# Patient Record
Sex: FEMALE | Race: WHITE | NOT HISPANIC OR LATINO | URBAN - METROPOLITAN AREA
[De-identification: names, ages, dates, MRNs, and addresses within clinical notes are randomized per-mention and may not be internally consistent; named-entity substitution may affect disease eponyms.]

---

## 2018-01-02 ENCOUNTER — EMERGENCY (EMERGENCY)
Facility: HOSPITAL | Age: 53
LOS: 1 days | End: 2018-01-02
Payer: COMMERCIAL

## 2018-01-02 PROCEDURE — 99285 EMERGENCY DEPT VISIT HI MDM: CPT

## 2018-01-02 PROCEDURE — 74177 CT ABD & PELVIS W/CONTRAST: CPT | Mod: 26

## 2018-12-05 ENCOUNTER — TRANSCRIPTION ENCOUNTER (OUTPATIENT)
Age: 53
End: 2018-12-05

## 2019-09-13 ENCOUNTER — OUTPATIENT (OUTPATIENT)
Dept: OUTPATIENT SERVICES | Facility: HOSPITAL | Age: 54
LOS: 1 days | End: 2019-09-13

## 2019-09-13 ENCOUNTER — EMERGENCY (EMERGENCY)
Facility: HOSPITAL | Age: 54
LOS: 1 days | End: 2019-09-13
Admitting: INTERNAL MEDICINE
Payer: COMMERCIAL

## 2019-09-13 PROCEDURE — 93010 ELECTROCARDIOGRAM REPORT: CPT

## 2019-09-13 PROCEDURE — 99285 EMERGENCY DEPT VISIT HI MDM: CPT

## 2019-09-13 PROCEDURE — 71045 X-RAY EXAM CHEST 1 VIEW: CPT | Mod: 26

## 2019-09-13 PROCEDURE — 99254 IP/OBS CNSLTJ NEW/EST MOD 60: CPT

## 2019-09-14 ENCOUNTER — OUTPATIENT (OUTPATIENT)
Dept: OUTPATIENT SERVICES | Facility: HOSPITAL | Age: 54
LOS: 1 days | End: 2019-09-14

## 2019-09-14 PROCEDURE — 99232 SBSQ HOSP IP/OBS MODERATE 35: CPT

## 2019-09-14 PROCEDURE — 93010 ELECTROCARDIOGRAM REPORT: CPT

## 2019-09-16 PROBLEM — Z00.00 ENCOUNTER FOR PREVENTIVE HEALTH EXAMINATION: Status: ACTIVE | Noted: 2019-09-16

## 2019-09-24 ENCOUNTER — APPOINTMENT (OUTPATIENT)
Dept: CARDIOLOGY | Facility: CLINIC | Age: 54
End: 2019-09-24
Payer: COMMERCIAL

## 2019-09-24 ENCOUNTER — NON-APPOINTMENT (OUTPATIENT)
Age: 54
End: 2019-09-24

## 2019-09-24 VITALS
HEIGHT: 67 IN | DIASTOLIC BLOOD PRESSURE: 70 MMHG | SYSTOLIC BLOOD PRESSURE: 104 MMHG | WEIGHT: 224 LBS | BODY MASS INDEX: 35.16 KG/M2 | OXYGEN SATURATION: 96 % | HEART RATE: 84 BPM | RESPIRATION RATE: 18 BRPM

## 2019-09-24 DIAGNOSIS — Z78.9 OTHER SPECIFIED HEALTH STATUS: ICD-10-CM

## 2019-09-24 DIAGNOSIS — Z72.3 LACK OF PHYSICAL EXERCISE: ICD-10-CM

## 2019-09-24 DIAGNOSIS — Z80.3 FAMILY HISTORY OF MALIGNANT NEOPLASM OF BREAST: ICD-10-CM

## 2019-09-24 DIAGNOSIS — R06.02 SHORTNESS OF BREATH: ICD-10-CM

## 2019-09-24 DIAGNOSIS — Z83.49 FAMILY HISTORY OF OTHER ENDOCRINE, NUTRITIONAL AND METABOLIC DISEASES: ICD-10-CM
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
## 2019-09-24 DIAGNOSIS — R42 DIZZINESS AND GIDDINESS: ICD-10-CM

## 2019-09-24 PROCEDURE — 99214 OFFICE O/P EST MOD 30 MIN: CPT

## 2019-09-24 NOTE — PHYSICAL EXAM
[General Appearance - Well Developed] : well developed [Normal Appearance] : normal appearance [Well Groomed] : well groomed [General Appearance - Well Nourished] : well nourished [No Deformities] : no deformities [General Appearance - In No Acute Distress] : no acute distress [Normal Conjunctiva] : the conjunctiva exhibited no abnormalities [Eyelids - No Xanthelasma] : the eyelids demonstrated no xanthelasmas [No Oral Pallor] : no oral pallor [No Oral Cyanosis] : no oral cyanosis [Normal Jugular Venous A Waves Present] : normal jugular venous A waves present [No Jugular Venous Husain A Waves] : no jugular venous husain A waves [Normal Jugular Venous V Waves Present] : normal jugular venous V waves present [Heart Rate And Rhythm] : heart rate and rhythm were normal [Heart Sounds] : normal S1 and S2 [Exaggerated Use Of Accessory Muscles For Inspiration] : no accessory muscle use [Respiration, Rhythm And Depth] : normal respiratory rhythm and effort [Bowel Sounds] : normal bowel sounds [Auscultation Breath Sounds / Voice Sounds] : lungs were clear to auscultation bilaterally [Abdomen Tenderness] : non-tender [Abdomen Soft] : soft [Abdomen Mass (___ Cm)] : no abdominal mass palpated [Gait - Sufficient For Exercise Testing] : the gait was sufficient for exercise testing [Abnormal Walk] : normal gait [Nail Clubbing] : no clubbing of the fingernails [Cyanosis, Localized] : no localized cyanosis [Petechial Hemorrhages (___cm)] : no petechial hemorrhages [] : no rash [Skin Color & Pigmentation] : normal skin color and pigmentation [No Venous Stasis] : no venous stasis [Skin Lesions] : no skin lesions [No Xanthoma] : no  xanthoma was observed [No Skin Ulcers] : no skin ulcer [Affect] : the affect was normal [Oriented To Time, Place, And Person] : oriented to person, place, and time [Mood] : the mood was normal [No Anxiety] : not feeling anxious [FreeTextEntry1] : I-II/VI JONI RSB.  +1 edema b/l.

## 2019-09-24 NOTE — ASSESSMENT
[FreeTextEntry1] : To review, Emily is a very pleasant 54-year-old female with the following active issues.\par \par Chest pain: Recent admission into the hospital. Records have been requested. With significant family history, slightly abnormal EKG, and both typical and atypical symptoms, would recommend stress echocardiogram for further evaluation. Lab work will be ordered her for further risk stratification. Red flag symptoms discussed.\par \par Shortness of breath: History of mitral valve prolapse.  Evaluation as above. She will get me copies of her nose recent echocardiogram.  Consider repeat depending on quality of study as this was supposedly done by mobile imaging.\par \par Dizziness: Carotid ultrasound was also supposedly performed by mobile imaging. Will review results as soon as they're available. Recommend possible followup with ENT to evaluate vertiginous symptoms.  Advise patient to increase fluid intake as she works outside a lot and has had documented episodes of hypotension at [employer] office. She admits to not drinking very much.\par \par Hypothyroidism: Follows with Dr. Herr.  Reports last TSH was normal. Continue followup.
Initiate Treatment: Spironolactone 50mg bid
Otc Regimen: Continue Minoxidil as directed \\nViviscal
Detail Level: Zone

## 2019-09-24 NOTE — REVIEW OF SYSTEMS
[Shortness Of Breath] : shortness of breath [Chest Pain] : chest pain [Lower Ext Edema] : lower extremity edema [Dizziness] : dizziness [Negative] : Heme/Lymph [Dyspnea on exertion] : not dyspnea during exertion [Chest  Pressure] : no chest pressure [Leg Claudication] : no intermittent leg claudication [Palpitations] : no palpitations

## 2019-09-24 NOTE — REASON FOR VISIT
[Consultation] : a consultation regarding [FreeTextEntry2] : Chest pain and shortness of breath [FreeTextEntry1] : Tiffanie is a very pleasant 54-year-old female that presents today for cardiac consultation. She has a past medical history as noted below.  Recently admitted to [facility] for chest pain and shortness of breath. I unfortunately do not have those records to review. Patient states that she was kept overnight and had serial blood draws. States that she was discharged with instructions to followup as an outpatient. Records will be requested.\par \par Chest pain: Started on Thursday evening and continued into Friday morning. Left side of the chest radiating down left arm. No aggravating or alleviating symptoms. There was associated shortness of breath. Denied nausea or diaphoresis.  Presented to ED for further evaluation and was subsequently admitted.  \par \par Admits to occasional edema.  Denies PND or orthopnea.\par \par Also admits to episodes of dizziness. Occurs with head position or standing up quickly. Patient states that she recently had echocardiogram and carotid ultrasound done for her job. She will bring us copies. Denies any syncope or near syncope.  Carotid ultrasound performed for screening purposes through her job. She will bring in copies of those reports.  \par \par She does admit to tick exposures on her job. She has not been evaluated for tick born disease. \par \par She works for the [employer] working outside on a regular basis but does not routinely exercise.\par \par EKG done in the office today for evaluation of chest pain shortness of breath was ordered and reviewed by me. Normal sinus rhythm with incomplete right bundle branch block.\par \par Blood pressure on my evaluation. Left arm 112/68 mmHg. Right arm 112/62 mmHg.

## 2019-10-10 ENCOUNTER — APPOINTMENT (OUTPATIENT)
Dept: CARDIOLOGY | Facility: CLINIC | Age: 54
End: 2019-10-10
Payer: COMMERCIAL

## 2019-10-10 PROCEDURE — 93351 STRESS TTE COMPLETE: CPT

## 2019-10-15 ENCOUNTER — APPOINTMENT (OUTPATIENT)
Dept: CARDIOLOGY | Facility: CLINIC | Age: 54
End: 2019-10-15
Payer: COMMERCIAL

## 2019-10-15 VITALS
SYSTOLIC BLOOD PRESSURE: 120 MMHG | DIASTOLIC BLOOD PRESSURE: 68 MMHG | BODY MASS INDEX: 35.31 KG/M2 | HEIGHT: 67 IN | OXYGEN SATURATION: 96 % | WEIGHT: 225 LBS | HEART RATE: 72 BPM

## 2019-10-15 DIAGNOSIS — R07.9 CHEST PAIN, UNSPECIFIED: ICD-10-CM

## 2019-10-15 DIAGNOSIS — I34.1 NONRHEUMATIC MITRAL (VALVE) PROLAPSE: ICD-10-CM

## 2019-10-15 PROCEDURE — 99214 OFFICE O/P EST MOD 30 MIN: CPT

## 2019-10-30 NOTE — REVIEW OF SYSTEMS
[Negative] : Heme/Lymph [Shortness Of Breath] : no shortness of breath [Dyspnea on exertion] : not dyspnea during exertion [Chest  Pressure] : no chest pressure [Chest Pain] : no chest pain [Lower Ext Edema] : no extremity edema [Leg Claudication] : no intermittent leg claudication [Palpitations] : no palpitations [Dizziness] : no dizziness

## 2019-10-30 NOTE — ASSESSMENT
[FreeTextEntry1] : To review, Emily is a very pleasant 54-year-old female with the following active issues.\par \par History of chest pain which has not recurred. Stress echo without evidence of ischemia. Limitations of non-invasive testing reviewed. \par \par History of mitral valve prolapse. Patient will obtain recent echocardiogram.  Consider repeat depending on quality of study as this was supposedly done by mobile imaging.\par \par Carotid ultrasound was also supposedly performed by mobile imaging. Will review results as soon as they are available.\par \par Hypothyroidism: Follows with Dr. Herr.  Reports last TSH was normal. Continue followup.\par \par Patient is cleared from a cardiovascular standpoint to return to her physical duties at the [employer]. \par \par Red flag symptoms which would warrant sooner emergent evaluation reviewed with the patient. \par Questions and concerns were addressed and answered. \par \par Sincerely,\par \par Candy Nava PA-C\par Patients history, testing and plan reviewed with supervising MD: Dr. Staci Stone

## 2019-10-30 NOTE — PHYSICAL EXAM
[General Appearance - Well Developed] : well developed [Normal Appearance] : normal appearance [Well Groomed] : well groomed [No Deformities] : no deformities [General Appearance - Well Nourished] : well nourished [General Appearance - In No Acute Distress] : no acute distress [Normal Conjunctiva] : the conjunctiva exhibited no abnormalities [Eyelids - No Xanthelasma] : the eyelids demonstrated no xanthelasmas [No Oral Pallor] : no oral pallor [No Oral Cyanosis] : no oral cyanosis [Normal Jugular Venous A Waves Present] : normal jugular venous A waves present [No Jugular Venous Husain A Waves] : no jugular venous husain A waves [Normal Jugular Venous V Waves Present] : normal jugular venous V waves present [Respiration, Rhythm And Depth] : normal respiratory rhythm and effort [Exaggerated Use Of Accessory Muscles For Inspiration] : no accessory muscle use [Auscultation Breath Sounds / Voice Sounds] : lungs were clear to auscultation bilaterally [Heart Rate And Rhythm] : heart rate and rhythm were normal [Heart Sounds] : normal S1 and S2 [Bowel Sounds] : normal bowel sounds [Abdomen Soft] : soft [Abdomen Tenderness] : non-tender [Abdomen Mass (___ Cm)] : no abdominal mass palpated [Abnormal Walk] : normal gait [Gait - Sufficient For Exercise Testing] : the gait was sufficient for exercise testing [Cyanosis, Localized] : no localized cyanosis [Nail Clubbing] : no clubbing of the fingernails [Petechial Hemorrhages (___cm)] : no petechial hemorrhages [Skin Color & Pigmentation] : normal skin color and pigmentation [] : no rash [No Venous Stasis] : no venous stasis [Skin Lesions] : no skin lesions [No Skin Ulcers] : no skin ulcer [No Xanthoma] : no  xanthoma was observed [Oriented To Time, Place, And Person] : oriented to person, place, and time [Affect] : the affect was normal [Mood] : the mood was normal [No Anxiety] : not feeling anxious [FreeTextEntry1] : I-II/VI JONI RSB.  +1 edema b/l.

## 2019-10-30 NOTE — REASON FOR VISIT
[Consultation] : a consultation regarding [FreeTextEntry2] : Chest pain and shortness of breath [FreeTextEntry1] : Tiffanie is a very pleasant 54-year-old female that presents today in clinical follow-up to review stress echo. She has a past medical history as noted below.  Recently admitted to [facility] for chest pain and shortness of breath. She feels that chest pain was musculoskeletal and has not recurred. Physically active with her job with no new exertional complaints.\par \par Chest pain was described as left sided and increased with movement and pressure to area.There was associated shortness of breath. Denied nausea or diaphoresis.  \par \par She works for [employer] working outside on a regular basis but does not routinely exercise.\par \par EKG Normal sinus rhythm with incomplete right bundle branch block.\par \par Today she denies chest pain, pressure, unusual shortness of breath, lightheadedness, dizziness, near syncope or syncope. \par \par There is no prior history of myocardial infarction, coronary revascularization, history of ischemic heart disease, or symptomatic congestive heart failure. There is no history of symptomatic arrhythmias including atrial fibrillation. \par \par Stress echo 10/10/19 exercised for 9min 25sec of Christian protocol with no evidence of ischemia

## 2020-05-06 ENCOUNTER — TRANSCRIPTION ENCOUNTER (OUTPATIENT)
Age: 55
End: 2020-05-06

## 2020-10-13 ENCOUNTER — APPOINTMENT (OUTPATIENT)
Dept: CARDIOLOGY | Facility: CLINIC | Age: 55
End: 2020-10-13

## 2022-10-17 ENCOUNTER — NON-APPOINTMENT (OUTPATIENT)
Age: 57
End: 2022-10-17

## 2022-10-17 DIAGNOSIS — Z82.49 FAMILY HISTORY OF ISCHEMIC HEART DISEASE AND OTHER DISEASES OF THE CIRCULATORY SYSTEM: ICD-10-CM

## 2022-10-17 DIAGNOSIS — Z81.8 FAMILY HISTORY OF OTHER MENTAL AND BEHAVIORAL DISORDERS: ICD-10-CM

## 2022-10-17 DIAGNOSIS — Z78.9 OTHER SPECIFIED HEALTH STATUS: ICD-10-CM

## 2022-10-17 DIAGNOSIS — Z78.0 ASYMPTOMATIC MENOPAUSAL STATE: ICD-10-CM

## 2022-10-17 DIAGNOSIS — R23.2 FLUSHING: ICD-10-CM

## 2022-12-05 ENCOUNTER — APPOINTMENT (OUTPATIENT)
Dept: ENDOCRINOLOGY | Facility: CLINIC | Age: 57
End: 2022-12-05

## 2022-12-05 VITALS
HEIGHT: 67 IN | TEMPERATURE: 97.7 F | HEART RATE: 83 BPM | SYSTOLIC BLOOD PRESSURE: 138 MMHG | OXYGEN SATURATION: 99 % | DIASTOLIC BLOOD PRESSURE: 88 MMHG | BODY MASS INDEX: 36.44 KG/M2 | WEIGHT: 232.19 LBS

## 2022-12-05 DIAGNOSIS — R23.8 OTHER SKIN CHANGES: ICD-10-CM

## 2022-12-05 PROCEDURE — 99213 OFFICE O/P EST LOW 20 MIN: CPT

## 2022-12-05 NOTE — ASSESSMENT
[FreeTextEntry1] : Iron white female with a past medical history of a primary hypothyroidism presents with a main complaint of or dryness of the hair flaky changes loss of eyebrows blood test done in October which showed that the TSH was 3.14 total T4 was 7.3 and  the thyroid was  antibodies were positive.  Patient clinically is if euthyroid suggest slightly elevated and the T4 is  also on the borderline.  Recommendation\par 1.  I would increase the patient levothyroxine 200 mcg tablets every day.  The goal of therapy is to maintain TSH between 1 and 2\par 2.  Patient may require some further work-up of her of her dry skin such as an autoimmune disease which can cause similar changes\par 3.  Patient will need a repeat set of labs to check her TSH and a free T4 in 6 weeks time the plan was discussed in detail with the patient.  Thank you

## 2022-12-05 NOTE — HISTORY OF PRESENT ILLNESS
[FreeTextEntry1] : This is a 57-year-old white female with a past medical history of primary hypothyroidism, multinodular goiter and Hashimoto's disease who is currently maintained on levothyroxine 88 mcg tablets every day.  Patient was last seen in the office approximately 10 months ago.  Patient claimed that she is compliant with her medications and takes it on a daily basis.  However that that the patient is complaining of dry and scaly hair with some hair loss on the scalp area for the past few weeks.  She denies any history of recent infections.  There is no history of any palpitations chest pain shortness of breath.  She did notice that she has been losing her eyebrows also.

## 2022-12-05 NOTE — PHYSICAL EXAM
[Well Nourished] : well nourished [Healthy Appearance] : healthy appearance [No Acute Distress] : no acute distress [Normal Sclera/Conjunctiva] : normal sclera/conjunctiva [PERRL] : pupils equal, round and reactive to light [Normal Outer Ear/Nose] : the ears and nose were normal in appearance [Normal Hearing] : hearing was normal [No Neck Mass] : no neck mass was observed [Thyroid Not Enlarged] : the thyroid was not enlarged [Clear to Auscultation] : lungs were clear to auscultation bilaterally [Normal S1, S2] : normal S1 and S2 [Normal Rate] : heart rate was normal [Carotids Normal] : carotid pulses were normal with no bruits [No Edema] : no peripheral edema [Pedal Pulses Normal] : the pedal pulses are present [Soft] : abdomen soft [No HSM] : no hepato-splenomegaly [Normal Supraclavicular Nodes] : no supraclavicular lymphadenopathy [Normal Anterior Cervical Nodes] : no anterior cervical lymphadenopathy [No Stigmata of Cushings Syndrome] : no stigmata of Cushings Syndrome [No Joint Swelling] : no joint swelling seen [No Rash] : no rash [No Motor Deficits] : the motor exam was normal [Normal Reflexes] : deep tendon reflexes were 2+ and symmetric [No Tremors] : no tremors [de-identified] : Deferred [FreeTextEntry1] : Deferred [de-identified] : Deferred [de-identified] : Dry skin of the scalp

## 2023-03-16 ENCOUNTER — APPOINTMENT (OUTPATIENT)
Dept: ENDOCRINOLOGY | Facility: CLINIC | Age: 58
End: 2023-03-16
Payer: COMMERCIAL

## 2023-03-16 VITALS
TEMPERATURE: 98.3 F | DIASTOLIC BLOOD PRESSURE: 70 MMHG | HEART RATE: 82 BPM | SYSTOLIC BLOOD PRESSURE: 118 MMHG | BODY MASS INDEX: 38.34 KG/M2 | WEIGHT: 244.25 LBS | OXYGEN SATURATION: 99 % | HEIGHT: 67 IN

## 2023-03-16 DIAGNOSIS — E04.2 NONTOXIC MULTINODULAR GOITER: ICD-10-CM

## 2023-03-16 PROCEDURE — 99213 OFFICE O/P EST LOW 20 MIN: CPT

## 2023-03-16 RX ORDER — LEVOTHYROXINE SODIUM 88 UG/1
88 TABLET ORAL DAILY
Qty: 60 | Refills: 0 | Status: DISCONTINUED | COMMUNITY
Start: 2019-09-24 | End: 2023-03-16

## 2023-03-16 NOTE — ASSESSMENT
[FreeTextEntry1] : 57-year-old white female with a past medical history of a multinodular goiter and primary hypothyroidism.  She recently underwent a sonogram of the thyroid on March 14 which did not show any significant changes in the left lower pole thyroid nodule which is solid and isoechoic.  It measures 1.2 x 0.7 x 1.0 cm and it has not changed significantly from the previous study in 2022.  The right lobe of the thyroid does not show any discrete dominant thyroid nodule.  Patient also had a blood test performed on 3/10/2023 which shows a normal TSH of 1.13 and a free T4 of 1.21.  Patient exam is essentially stable without any changes in in her examination.  Clinically she is euthyroid.  Recommendation\par 1.  I did advise the patient to continue her current dose of levothyroxine 100 mcg tablets daily\par 2.  The importance of strict diet and exercise and to maintain a normal weight was discussed with the patient.\par 3.  If the patient condition remained stable then she will follow-up in approximately 1 years time.  Thank you

## 2023-03-16 NOTE — HISTORY OF PRESENT ILLNESS
[FreeTextEntry1] : .-year-old white female with a past medical history of primary hypothyroidism multinodular goiter and Hashimoto's disease and who is currently maintained on levothyroxine 88 mcg tablets every day.  Patient now presents for routine follow-up and evaluation.  Patient claimed that she is compliant with her medications and she takes it daily in the morning on empty stomach.  Does report a slight weight gain but she has not been working secondary to injury to her left index finger.  She denies any symptoms of palpitations chest pain hair loss or heat intolerance.

## 2023-04-25 RX ORDER — LEVOTHYROXINE SODIUM 0.1 MG/1
100 TABLET ORAL DAILY
Qty: 90 | Refills: 2 | Status: ACTIVE | COMMUNITY
Start: 2023-03-16 | End: 1900-01-01

## 2023-04-25 RX ORDER — LEVOTHYROXINE SODIUM 0.1 MG/1
100 TABLET ORAL
Qty: 90 | Refills: 2 | Status: DISCONTINUED | COMMUNITY
Start: 2022-12-05 | End: 2023-04-25

## 2023-12-09 ENCOUNTER — NON-APPOINTMENT (OUTPATIENT)
Age: 58
End: 2023-12-09

## 2024-03-18 ENCOUNTER — APPOINTMENT (OUTPATIENT)
Dept: ENDOCRINOLOGY | Facility: CLINIC | Age: 59
End: 2024-03-18
Payer: COMMERCIAL

## 2024-03-18 VITALS
HEIGHT: 67 IN | BODY MASS INDEX: 34.53 KG/M2 | RESPIRATION RATE: 16 BRPM | HEART RATE: 82 BPM | DIASTOLIC BLOOD PRESSURE: 68 MMHG | TEMPERATURE: 98.1 F | SYSTOLIC BLOOD PRESSURE: 122 MMHG | WEIGHT: 220 LBS | OXYGEN SATURATION: 99 %

## 2024-03-18 DIAGNOSIS — E03.9 HYPOTHYROIDISM, UNSPECIFIED: ICD-10-CM

## 2024-03-18 PROCEDURE — 99213 OFFICE O/P EST LOW 20 MIN: CPT

## 2024-03-18 PROCEDURE — G2211 COMPLEX E/M VISIT ADD ON: CPT

## 2024-03-18 NOTE — ASSESSMENT
[FreeTextEntry1] : Middle-age white female with a past medical history of hypothyroidism secondary to Hashimoto's disease currently on levothyroxine 88 mcg tablet for the past 2 weeks.  Patient had a blood drawn on 4 March of this year and at that time her complete metabolic panel was normal, TSH was 1.1 and the free T4 was 0.9.  Patient at that time was still taking the levothyroxine 100 mcg tablets daily.  Patient clinically stable and euthyroid.  She also has underlying obesity.  Recommendation 1.  I have advised the patient to continue with the levothyroxine 88 mcg tablets daily. 2.  Patient is strongly advised to repeat a blood test to check her TSH level in approximately 6 weeks time.  If the levels are within acceptable range then she may continue with the 88 mcg tablets daily. 3.  The above plan was discussed in detail with the patient.  If the patient condition remained stable she will then follow-up in 6 months time.  Thank you

## 2024-03-18 NOTE — REVIEW OF SYSTEMS
[Fatigue] : fatigue [Recent Weight Loss (___ Lbs)] : recent weight loss: [unfilled] lbs [Fever] : no fever [Chills] : no chills [Negative] : Heme/Lymph

## 2024-03-18 NOTE — HISTORY OF PRESENT ILLNESS
[FreeTextEntry1] : 58-year-old white female who has a past medical history of multinodular goiter, hypothyroidism and Hashimoto's disease.  Patient is currently on levothyroxine 88 mcg tablets for the past 2 weeks but prior to that she was taking 100 mcg tablets and she herself decided to change it to 88 mcg daily.  Patient has been complaining of for weight loss of 30 pounds over the past 3 months.  She claimed that this is due to her eating a low-carb diet and more physical activity.  She denies any symptoms of palpitations chest pains, hair loss, chest discomfort and difficulty in swallowing.  Review of systems is otherwise negative.

## 2024-03-18 NOTE — PHYSICAL EXAM
[Alert] : alert [No Acute Distress] : no acute distress [Well Nourished] : well nourished [Well Developed] : well developed [Normal Sclera/Conjunctiva] : normal sclera/conjunctiva [EOMI] : extra ocular movement intact [No Proptosis] : no proptosis [Normal Oropharynx] : the oropharynx was normal [Thyroid Not Enlarged] : the thyroid was not enlarged [No Respiratory Distress] : no respiratory distress [No Accessory Muscle Use] : no accessory muscle use [Clear to Auscultation] : lungs were clear to auscultation bilaterally [Normal S1, S2] : normal S1 and S2 [Normal Rate] : heart rate was normal [Regular Rhythm] : with a regular rhythm [No Edema] : no peripheral edema [Pedal Pulses Normal] : the pedal pulses are present [Normal Bowel Sounds] : normal bowel sounds [Not Tender] : non-tender [Not Distended] : not distended [Soft] : abdomen soft [Normal Anterior Cervical Nodes] : no anterior cervical lymphadenopathy [Normal Posterior Cervical Nodes] : no posterior cervical lymphadenopathy [No Spinal Tenderness] : no spinal tenderness [Spine Straight] : spine straight [No Stigmata of Cushings Syndrome] : no stigmata of Cushings Syndrome [Normal Gait] : normal gait [Normal Strength/Tone] : muscle strength and tone were normal [No Rash] : no rash [Acanthosis Nigricans] : no acanthosis nigricans [Normal Reflexes] : deep tendon reflexes were 2+ and symmetric [No Tremors] : no tremors [Oriented x3] : oriented to person, place, and time [de-identified] : Mild palpable bilateral thyroid nodules

## 2024-05-10 RX ORDER — LEVOTHYROXINE SODIUM 0.09 MG/1
88 TABLET ORAL
Qty: 90 | Refills: 2 | Status: ACTIVE | COMMUNITY
Start: 2024-05-10 | End: 1900-01-01